# Patient Record
Sex: FEMALE | Race: ASIAN
[De-identification: names, ages, dates, MRNs, and addresses within clinical notes are randomized per-mention and may not be internally consistent; named-entity substitution may affect disease eponyms.]

---

## 2018-01-13 ENCOUNTER — HOSPITAL ENCOUNTER (EMERGENCY)
Dept: HOSPITAL 72 - EMR | Age: 28
Discharge: HOME | End: 2018-01-13
Payer: COMMERCIAL

## 2018-01-13 VITALS — SYSTOLIC BLOOD PRESSURE: 113 MMHG | DIASTOLIC BLOOD PRESSURE: 79 MMHG

## 2018-01-13 VITALS — WEIGHT: 118 LBS | BODY MASS INDEX: 18.52 KG/M2 | HEIGHT: 67 IN

## 2018-01-13 VITALS — DIASTOLIC BLOOD PRESSURE: 79 MMHG | SYSTOLIC BLOOD PRESSURE: 113 MMHG

## 2018-01-13 DIAGNOSIS — M54.5: ICD-10-CM
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
## 2018-01-13 DIAGNOSIS — M54.2: Primary | ICD-10-CM

## 2018-01-13 PROCEDURE — 99284 EMERGENCY DEPT VISIT MOD MDM: CPT
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
## 2019-10-08 ENCOUNTER — HOSPITAL ENCOUNTER (EMERGENCY)
Dept: HOSPITAL 72 - EMR | Age: 29
Discharge: HOME | End: 2019-10-08
Payer: COMMERCIAL

## 2019-10-08 VITALS — SYSTOLIC BLOOD PRESSURE: 104 MMHG | DIASTOLIC BLOOD PRESSURE: 70 MMHG

## 2019-10-08 VITALS — HEIGHT: 65 IN | BODY MASS INDEX: 24.99 KG/M2 | WEIGHT: 150 LBS

## 2019-10-08 VITALS — DIASTOLIC BLOOD PRESSURE: 68 MMHG | SYSTOLIC BLOOD PRESSURE: 112 MMHG

## 2019-10-08 VITALS — SYSTOLIC BLOOD PRESSURE: 106 MMHG | DIASTOLIC BLOOD PRESSURE: 67 MMHG

## 2019-10-08 DIAGNOSIS — Z3A.00: ICD-10-CM

## 2019-10-08 DIAGNOSIS — R10.13: ICD-10-CM

## 2019-10-08 DIAGNOSIS — O26.893: Primary | ICD-10-CM

## 2019-10-08 LAB
ADD MANUAL DIFF: NO
ALBUMIN SERPL-MCNC: 3.1 G/DL (ref 3.4–5)
ALBUMIN/GLOB SERPL: 0.8 {RATIO} (ref 1–2.7)
ALP SERPL-CCNC: 62 U/L (ref 46–116)
ALT SERPL-CCNC: 31 U/L (ref 12–78)
ANION GAP SERPL CALC-SCNC: 10 MMOL/L (ref 5–15)
APPEARANCE UR: CLEAR
APTT PPP: (no result) S
AST SERPL-CCNC: 19 U/L (ref 15–37)
BASOPHILS NFR BLD AUTO: 0.8 % (ref 0–2)
BILIRUB SERPL-MCNC: 0.3 MG/DL (ref 0.2–1)
BUN SERPL-MCNC: 7 MG/DL (ref 7–18)
CALCIUM SERPL-MCNC: 8.9 MG/DL (ref 8.5–10.1)
CHLORIDE SERPL-SCNC: 104 MMOL/L (ref 98–107)
CO2 SERPL-SCNC: 24 MMOL/L (ref 21–32)
CREAT SERPL-MCNC: 0.5 MG/DL (ref 0.55–1.3)
EOSINOPHIL NFR BLD AUTO: 1.8 % (ref 0–3)
ERYTHROCYTE [DISTWIDTH] IN BLOOD BY AUTOMATED COUNT: 10.6 % (ref 11.6–14.8)
GLOBULIN SER-MCNC: 3.9 G/DL
GLUCOSE UR STRIP-MCNC: NEGATIVE MG/DL
HCT VFR BLD CALC: 32.1 % (ref 37–47)
HGB BLD-MCNC: 11.5 G/DL (ref 12–16)
KETONES UR QL STRIP: NEGATIVE
LEUKOCYTE ESTERASE UR QL STRIP: NEGATIVE
LYMPHOCYTES NFR BLD AUTO: 17.6 % (ref 20–45)
MCV RBC AUTO: 98 FL (ref 80–99)
MONOCYTES NFR BLD AUTO: 6.3 % (ref 1–10)
NEUTROPHILS NFR BLD AUTO: 73.5 % (ref 45–75)
NITRITE UR QL STRIP: NEGATIVE
PH UR STRIP: 6.5 [PH] (ref 4.5–8)
PLATELET # BLD: 319 K/UL (ref 150–450)
POTASSIUM SERPL-SCNC: 3.6 MMOL/L (ref 3.5–5.1)
PROT UR QL STRIP: NEGATIVE
RBC # BLD AUTO: 3.27 M/UL (ref 4.2–5.4)
SODIUM SERPL-SCNC: 137 MMOL/L (ref 136–145)
SP GR UR STRIP: 1.01 (ref 1–1.03)
UROBILINOGEN UR-MCNC: NORMAL MG/DL (ref 0–1)
WBC # BLD AUTO: 14.1 K/UL (ref 4.8–10.8)

## 2019-10-08 PROCEDURE — 36415 COLL VENOUS BLD VENIPUNCTURE: CPT

## 2019-10-08 PROCEDURE — 83690 ASSAY OF LIPASE: CPT

## 2019-10-08 PROCEDURE — 85025 COMPLETE CBC W/AUTO DIFF WBC: CPT

## 2019-10-08 PROCEDURE — 99284 EMERGENCY DEPT VISIT MOD MDM: CPT

## 2019-10-08 PROCEDURE — 80053 COMPREHEN METABOLIC PANEL: CPT

## 2019-10-08 PROCEDURE — 96361 HYDRATE IV INFUSION ADD-ON: CPT

## 2019-10-08 PROCEDURE — 81003 URINALYSIS AUTO W/O SCOPE: CPT

## 2019-10-08 PROCEDURE — 96376 TX/PRO/DX INJ SAME DRUG ADON: CPT

## 2019-10-08 PROCEDURE — 96374 THER/PROPH/DIAG INJ IV PUSH: CPT

## 2019-10-08 PROCEDURE — 96375 TX/PRO/DX INJ NEW DRUG ADDON: CPT

## 2019-10-08 NOTE — NUR
ER Nurse Note:



Pain meds not effective; ERMD notifed and ordered more pain med. Administed as 
ordered; pt tolerated well. Pt states 7/10 cramping pain. Pt denies n/v. All 
safety measures met; will continue to [facility].

## 2019-10-08 NOTE — EMERGENCY ROOM REPORT
History of Present Illness


General


Chief Complaint:  Abdominal Pain


Source:  Patient





Present Illness


HPI


Is a 29-year-old female 8 months pregnant.  She presents with chief complaint 

of epigastric pain.  Onset tonight.  No nausea or vomiting.  No fever chills.  

Denies any contraction.  Baby still moving and kicking.  Seen her OB/GYN today 

at [facility] and everything was normal.  Denies any other complaint.  No 

vaginal bleeding.


Allergies:  


Coded Allergies:  


     No Known Allergies (Unverified , 18)





Patient History


Past Medical History:  see triage record, old chart reviewed


Past Surgical History:  none


Pertinent Family History:  none


Social History:  Denies: smoking


Last Menstrual Period:  19


Pregnant Now:  Yes


:  1


Para:  0


Immunizations:  other


Reviewed Nursing Documentation:  PMH: Agreed; PSxH: Agreed





Nursing Documentation-PMH


Past Medical History:  No Stated History





Review of Systems


Eye:  Denies: eye pain, blurred vision


ENT:  Denies: ear pain, nose congestion, throat swelling


Respiratory:  Denies: cough, shortness of breath


Cardiovascular:  Denies: chest pain, palpitations


Gastrointestinal:  Reports: abdominal pain; Denies: diarrhea, nausea, vomiting


Musculoskeletal:  Denies: back pain, joint pain


Skin:  Denies: rash


Neurological:  Denies: headache, numbness


Endocrine:  Denies: increased thirst, increased urine


Hematologic/Lymphatic:  Denies: easy bruising


All Other Systems:  negative except mentioned in HPI





Physical Exam





Vital Signs








  Date Time  Temp Pulse Resp B/P (MAP) Pulse Ox O2 Delivery O2 Flow Rate FiO2


 


10/8/19 03:33 97.2 77 17 106/67 (80) 100 Room Air  





Vitals normal


Sp02 EP Interpretation:  reviewed, normal


General Appearance:  well appearing, no apparent distress, alert


Head:  normocephalic, atraumatic


Eyes:  bilateral eye PERRL, bilateral eye EOMI


ENT:  hearing grossly normal, normal pharynx


Neck:  full range of motion, supple, no meningismus


Respiratory:  chest non-tender, lungs clear, normal breath sounds


Cardiovascular #1:  regular rate, rhythm, no murmur


Gastrointestinal:  normal bowel sounds, non tender, no mass, no organomegaly, 

no bruit, non-distended, tenderness - Mild epigastric pain, other - Gravid.  

Good fetal movement palpated


Musculoskeletal:  back normal, gait/station normal, normal range of motion


Psychiatric:  mood/affect normal





Medical Decision Making


Diagnostic Impression:  


 Primary Impression:  


 Epigastric abdominal pain


ER Course


Patient presents with epigastric pain.  She is 8 months pregnant.  I suspect 

this is secondary to peptic ulcer disease, gastritis, reflux name a few.  I did 

a bedside ultrasound which show good fetal movement.  Good heartbeat.  

Gallbladder ultrasound was negative for gallstone.  This pain is been constant.

  I see no evidence of contraction or  labor.  Pain is better 

controlled.  Labs unremarkable.  Will discharge home.  If continue to be a 

problem, she will need to go to [facility] where her OB/GYN is.





Last Vital Signs








  Date Time  Temp Pulse Resp B/P (MAP) Pulse Ox O2 Delivery O2 Flow Rate FiO2


 


10/8/19 03:50 97.2 78 17 106/67 100 Room Air  








Status:  improved


Disposition:  HOME, SELF-CARE


Condition:  Stable


Scripts


Omeprazole Magnesium (PRILOSEC OTC) 20 Mg Tablet.


20 MG ORAL DAILY, #30 TAB


   Prov: Caleb Boogie MD         10/8/19


Referrals:  


NON PHYSICIAN (PCP)


Patient Instructions:  Abdominal Pain During Pregnancy





Additional Instructions:  


Follow-up with your OB/GYN in 2 3 days if not better.  Return if symptoms 

worsen.











Caleb Boogie MD Oct 8, 2019 04:00

## 2019-10-08 NOTE — NUR
ER Nurse Note:



Pt walked in c/o mid upper abd pain since 2200 10/07. Pt stated she took a walk 
after dinner and was resting when 8/10 cramping pain occured. Pt denies n/v, 
vaginal discharge and denies vaginal bleeding. Pt stated she is 8 months 
pregnant, due date Dec 31, 2019. Fetal heart tones heard, rate at 140 bpm. [facility]

## 2019-10-08 NOTE — NUR
ER Nurse Note:



Bedside ultrasound performed by ERMFRANCISCO. Pt stated she has pain; pain meds given 
per ERMD orders. VSS, no signs of distress. All safety measrues met; will 
conitnue to monitor.